# Patient Record
Sex: MALE | Race: WHITE | NOT HISPANIC OR LATINO | URBAN - METROPOLITAN AREA
[De-identification: names, ages, dates, MRNs, and addresses within clinical notes are randomized per-mention and may not be internally consistent; named-entity substitution may affect disease eponyms.]

---

## 2017-07-10 ENCOUNTER — RECORDS - HEALTHEAST (OUTPATIENT)
Dept: LAB | Facility: CLINIC | Age: 52
End: 2017-07-10

## 2017-07-10 LAB
CHOLEST SERPL-MCNC: 228 MG/DL
FASTING STATUS PATIENT QL REPORTED: ABNORMAL
HDLC SERPL-MCNC: 56 MG/DL
LDLC SERPL CALC-MCNC: 156 MG/DL
TRIGL SERPL-MCNC: 78 MG/DL

## 2018-09-11 ENCOUNTER — RECORDS - HEALTHEAST (OUTPATIENT)
Dept: LAB | Facility: CLINIC | Age: 53
End: 2018-09-11

## 2018-09-11 LAB
CHOLEST SERPL-MCNC: 225 MG/DL
FASTING STATUS PATIENT QL REPORTED: ABNORMAL
HDLC SERPL-MCNC: 72 MG/DL
LDLC SERPL CALC-MCNC: 139 MG/DL
TRIGL SERPL-MCNC: 71 MG/DL
TSH SERPL DL<=0.005 MIU/L-ACNC: 2.96 UIU/ML (ref 0.3–5)

## 2018-10-24 ENCOUNTER — OFFICE VISIT - HEALTHEAST (OUTPATIENT)
Dept: UROLOGY | Facility: CLINIC | Age: 53
End: 2018-10-24

## 2018-10-24 DIAGNOSIS — N20.1 CALCULUS OF URETER: ICD-10-CM

## 2018-10-24 DIAGNOSIS — N13.2 HYDRONEPHROSIS WITH URINARY OBSTRUCTION DUE TO URETERAL CALCULUS: ICD-10-CM

## 2018-10-24 DIAGNOSIS — N20.0 CALCULUS OF KIDNEY: ICD-10-CM

## 2018-10-24 LAB
ALBUMIN UR-MCNC: NEGATIVE MG/DL
APPEARANCE UR: CLEAR
BILIRUB UR QL STRIP: NEGATIVE
COLOR UR AUTO: YELLOW
GLUCOSE UR STRIP-MCNC: NEGATIVE MG/DL
HGB UR QL STRIP: ABNORMAL
KETONES UR STRIP-MCNC: NEGATIVE MG/DL
LEUKOCYTE ESTERASE UR QL STRIP: NEGATIVE
NITRATE UR QL: NEGATIVE
PH UR STRIP: 6.5 [PH] (ref 5–8)
SP GR UR STRIP: 1.02 (ref 1–1.03)
UROBILINOGEN UR STRIP-ACNC: ABNORMAL

## 2018-10-24 ASSESSMENT — MIFFLIN-ST. JEOR: SCORE: 1852.31

## 2018-11-07 ENCOUNTER — HOSPITAL ENCOUNTER (OUTPATIENT)
Dept: CT IMAGING | Facility: CLINIC | Age: 53
Discharge: HOME | End: 2018-11-07
Attending: PHYSICIAN ASSISTANT

## 2018-11-07 ENCOUNTER — OFFICE VISIT - HEALTHEAST (OUTPATIENT)
Dept: UROLOGY | Facility: CLINIC | Age: 53
End: 2018-11-07

## 2018-11-07 DIAGNOSIS — N20.0 CALCULUS OF KIDNEY: ICD-10-CM

## 2018-11-07 DIAGNOSIS — N20.1 CALCULUS OF URETER: ICD-10-CM

## 2018-11-07 DIAGNOSIS — N20.9 URINARY TRACT STONES: ICD-10-CM

## 2018-11-07 DIAGNOSIS — N13.2 HYDRONEPHROSIS WITH URINARY OBSTRUCTION DUE TO URETERAL CALCULUS: ICD-10-CM

## 2018-11-07 LAB
ALBUMIN UR-MCNC: NEGATIVE MG/DL
APPEARANCE UR: CLEAR
BILIRUB UR QL STRIP: NEGATIVE
COLOR UR AUTO: YELLOW
GLUCOSE UR STRIP-MCNC: NEGATIVE MG/DL
HGB UR QL STRIP: ABNORMAL
KETONES UR STRIP-MCNC: NEGATIVE MG/DL
LEUKOCYTE ESTERASE UR QL STRIP: NEGATIVE
NITRATE UR QL: NEGATIVE
PH UR STRIP: 7 [PH] (ref 5–8)
SP GR UR STRIP: 1.02 (ref 1–1.03)
UROBILINOGEN UR STRIP-ACNC: ABNORMAL

## 2018-11-13 ENCOUNTER — AMBULATORY - HEALTHEAST (OUTPATIENT)
Dept: LAB | Facility: CLINIC | Age: 53
End: 2018-11-13

## 2018-11-13 ENCOUNTER — COMMUNICATION - HEALTHEAST (OUTPATIENT)
Dept: UROLOGY | Facility: CLINIC | Age: 53
End: 2018-11-13

## 2018-11-13 DIAGNOSIS — N20.0 CALCULUS OF KIDNEY: ICD-10-CM

## 2018-11-14 LAB
1ST CONSTITUENT:: NORMAL
2ND CONSTITUENT:: NORMAL
SOURCE: NORMAL

## 2018-12-05 ENCOUNTER — COMMUNICATION - HEALTHEAST (OUTPATIENT)
Dept: UROLOGY | Facility: CLINIC | Age: 53
End: 2018-12-05

## 2018-12-12 ENCOUNTER — OFFICE VISIT - HEALTHEAST (OUTPATIENT)
Dept: UROLOGY | Facility: CLINIC | Age: 53
End: 2018-12-12

## 2018-12-12 DIAGNOSIS — N20.0 CALCULUS OF KIDNEY: ICD-10-CM

## 2018-12-12 DIAGNOSIS — N20.1 CALCULUS OF URETER: ICD-10-CM

## 2021-06-02 VITALS — BODY MASS INDEX: 29.39 KG/M2 | WEIGHT: 217 LBS | HEIGHT: 72 IN

## 2021-06-16 PROBLEM — N20.0 CALCULUS OF KIDNEY: Status: ACTIVE | Noted: 2018-10-24

## 2021-06-21 NOTE — PROGRESS NOTES
Assessment/Plan:        Diagnoses and all orders for this visit:    Calculus of ureter  -     Urinalysis Macroscopic  -     tamsulosin (FLOMAX) 0.4 mg cap; Take 1 capsule (0.4 mg total) by mouth Daily after breakfast for 14 days.  Dispense: 14 capsule; Refill: 1  -     Symptom Control While Passing a Stone Education  -     CT Abdomen Pelvis Without Oral Without IV Contrast; Future; Expected date: 11/7/18  -     Patient Stated Goal: Pass my stone    Calculus of kidney    Hydronephrosis with urinary obstruction due to ureteral calculus    Other orders  -     Cancel: Urinalysis Macroscopic  -     multivitamin (ONE A DAY) per tablet; Take by mouth.  -     Discontinue: PROAIR HFA 90 mcg/actuation inhaler; INHALE 2 PUFFS BY MOUTH 4 TIMES DAILY AS NEEDED FOR 30 DAYS; Refill: 0  -     HYDROcodone-acetaminophen 5-325 mg per tablet;   -     Discontinue: tamsulosin (FLOMAX) 0.4 mg cap;       Stone Management Plan  [facility] Stone Management 10/24/2018   Urinary Tract Infection No suspicion of infection   Renal Colic Asymptomatic at this time   Renal Failure No suspicion of renal failure   Current CT date 10/16/2018   Right sided stones? Yes   R Number of ureteral stones No ureteral stones   R Number of kidney stones  1   R GSD of kidney stones < 2   R Hydronephrosis None   R Stone Event No current event   R Current Plan Observe   Observe rationale Limited stone burden with good prognosis for spontaneous passage   Left sided stones? Yes   L Number of ureteral stones 1   L GSD of ureteral stones 5   L Location of ureteral stone Mid   L Number of kidney stones  2   L GSD of kidney stones 2 - 4   L Hydronephrosis Mild   L Stone Event New event   Diagnosis date 10/16/2018   Initial location of primary symptomatic stone Mid   Initial GSD of primary symptomatic stone 5   L MET Status Initiation   L Current Plan MET   MET 2 week F/U           Subjective:      HPI  Mr. Jadiel Deshpande is a 52 y.o.  male presenting to the [facility]  [facility] [place] referred for nephrolithiasis.    He is a first time unidentified composition stone former who has not required stone clearance procedures. He has not previously participated in stone risk evaluation. He has no identified modifiable stone risk factors. He has no identified non-modifiable stone risk factors.    He was evaluated through his PCP clinic 10/16/18 for complaint of acute onset left flank pain x 1 day. The pain was sharp and intermittent, radiating to the left abdomen.  Significant associated symptoms at presentation included:  Chest pain, numbness in fingers and toes. Urinalysis was notable for hematuria with mild bacteriuria, hemogram was unremarkable. EKG was normal. CT scan reported a 4 mm left mid ureteral stone with mild obstruction. He was sent home with dramamine, flomax, and norco. He did not get a urine strainer.    He has not had any further flank pain over past 5 days. He notes intermittent \"pinching\" sensation with suprapubic area. He is asymptomatic at present. He denies symptoms of fever, chills, flank pain, nausea, vomiting, urinary frequency and dysuria.     Outside CT scan from 10/16/18 is personally reviewed and demonstrates a mildly obstructing 5 mm left mid ureteral stone. Additional left renal stones x 2 (3 mm mid pole and 1 mm lower pole). There is a faint right lower pole papillary tip calcification. Left renal cortical cysts.    Significant labs from presentation include mild hematuria, no pyuria, negative nitrite, no bacteria and normal WBC.    PLAN    53 yo M first time stone former with mildly obstructing left mid ureteral stone, symptoms well controlled. Small renal stones.    Will proceed with medical expulsive therapy. Risks and benefits were detailed of medical expulsive therapy including probability of stone passage, recurrent renal colic, and requirement of emergency medical and/or surgical care and further imaging. Stone prevention measures reviewed " with patient. Patient verbalized understanding. Patient agrees with plan as discussed.  He will return in 2 weeks with low dose CT scan.    For symptom control, he has prescribed Percocet and Flomax. Over the counter symptom control medications of ibuprofen, Dramamine and Tylenol were recommended.    Patient also seen and examined by MEG Capone   Review of Systems  A 12 point comprehensive review of systems is negative except for HPI    Past Medical History:   Diagnosis Date     Kidney stone 10/16/2018     Past Surgical History:   Procedure Laterality Date     COLONOSCOPY W/ POLYPECTOMY      2 polyps removed     lower lumbar bulging disk surgery   2002     Current Outpatient Prescriptions   Medication Sig Dispense Refill     HYDROcodone-acetaminophen 5-325 mg per tablet        multivitamin (ONE A DAY) per tablet Take by mouth.       tamsulosin (FLOMAX) 0.4 mg cap Take 1 capsule (0.4 mg total) by mouth Daily after breakfast for 14 days. 14 capsule 1     No current facility-administered medications for this visit.      No Known Allergies    Social History     Social History     Marital status: Single     Spouse name: N/A     Number of children: N/A     Years of education: N/A     Occupational History           Social History Main Topics     Smoking status: Never Smoker     Smokeless tobacco: Never Used     Alcohol use Yes      Comment: rare     Drug use: No     Sexual activity: Not on file     Other Topics Concern     Not on file     Social History Narrative     No narrative on file     Family History   Problem Relation Age of Onset     Cancer Mother      lung     Polycythemia Mother      Hypothyroidism Mother      Hypothyroidism Father      Heart failure Maternal Grandfather      Other Paternal Grandfather      Black Lung Disease     Heart attack Paternal Grandfather      Hypothyroidism Maternal Uncle      Objective:      Physical Exam  Vitals:    10/24/18 1405   BP: 130/69   Pulse: 92    Temp: 98.5  F (36.9  C)     General - well developed, well nourished, appropriate for age. Appears no distress at this time   Heart - regular rate and rhythm, no murmur  Respiratory - normal effort, clear to auscultation, good air entry without adventitious noises  Abdomen - mildly obese soft, non-tender, no hepatosplenomegaly, no masses.   - no flank tenderness, no suprapubic tenderness, kidney and bladder non-palpable  MSK - normal spinal curvature. no spinal tenderness. normal gait. muscular strength intact.  Neurology - cranial nerves II-XII grossly intact, normal sensation, no unsteadiness  Skin - intact, no bruising, no gouty tophi  Psych - oriented to time, place, and person, normal mood and affect.    Labs  Urinalysis POC (Office):  Nitrite, UA   Date Value Ref Range Status   10/24/2018 Negative Negative Final       Lab Urinalysis:  Blood, UA   Date Value Ref Range Status   10/24/2018 Small (!) Negative Final     Nitrite, UA   Date Value Ref Range Status   10/24/2018 Negative Negative Final     Leukocytes, UA   Date Value Ref Range Status   10/24/2018 Negative Negative Final     pH, UA   Date Value Ref Range Status   10/24/2018 6.5 5.0 - 8.0 Final            I, Camden Vines, personally saw and examined the patient, reviewed most recent labs and imaging and agree with the above assessment

## 2021-06-21 NOTE — PROGRESS NOTES
Patient presents to the office today for kidney stone consultation from a Primary Care Referral.

## 2021-06-21 NOTE — PROGRESS NOTES
Assessment/Plan:        Diagnoses and all orders for this visit:    Calculus of ureter  -     Symptom Control While Passing a Stone Education  -     CT Abdomen Pelvis Without Oral Without IV Contrast; Future; Expected date: 11/28/18  -     Patient Stated Goal: Pass my stone    Urinary tract stones  -     Urinalysis Macroscopic    Calculus of kidney    Hydronephrosis with urinary obstruction due to ureteral calculus      Stone Management Plan  [facility] Stone [facility] 10/24/2018   Urinary Tract Infection No suspicion of infection   Renal Colic Asymptomatic at this time   Renal Failure No suspicion of renal failure   Current CT date 10/16/2018   Right sided stones? Yes   R Number of ureteral stones No ureteral stones   R Number of kidney stones  1   R GSD of kidney stones < 2   R Hydronephrosis None   R Stone Event No current event   R Current Plan Observe   Observe rationale Limited stone burden with good prognosis for spontaneous passage   Left sided stones? Yes   L Number of ureteral stones 1   L GSD of ureteral stones 5   L Location of ureteral stone Mid   L Number of kidney stones  2   L GSD of kidney stones 2 - 4   L Hydronephrosis Mild   L Stone Event New event   Diagnosis date 10/16/2018   Initial location of primary symptomatic stone Mid   Initial GSD of primary symptomatic stone 5   L MET Status Initiation   L Current Plan MET   MET 2 week F/U           Subjective:      HPI  Mr. Jadiel Deshpande is a 52 y.o.  male returning to the [facility] [place] for medical expulsive therapy follow up.     On last encounter, his 5 mm stone was in left mid ureter with mild hydronephrosis. He has had no unanticipated events.    Symptoms have been minimal. Within the past week, he has had onset of urinary urgency. He has not seen a stone pass. He is asymptomatic at present. He denies symptoms of fever, chills, flank pain, nausea, vomiting, urinary frequency and dysuria.     New CT scan was personally reviewed  and demonstrates progression of stone to left UVJ with mild hydronephrosis.   Small, bilateral renal stones.     PLAN    51 yo M first time stone former with progression of left ureteral stone, now within UVJ, stable mild hydronephrosis. Small renal stones.     He will continue to attempt to pass stone and will return in 3 weeks with further imaging.        ROS   Review of systems is negative except for HPI.    Past Medical History:   Diagnosis Date     Kidney stone 10/16/2018     Past Surgical History:   Procedure Laterality Date     COLONOSCOPY W/ POLYPECTOMY      2 polyps removed     lower lumbar bulging disk surgery   2002     Current Outpatient Prescriptions   Medication Sig Dispense Refill     multivitamin (ONE A DAY) per tablet Take by mouth.       HYDROcodone-acetaminophen 5-325 mg per tablet        tamsulosin (FLOMAX) 0.4 mg cap Take 1 capsule (0.4 mg total) by mouth Daily after breakfast for 14 days. 14 capsule 1     No current facility-administered medications for this visit.      No Known Allergies    Social History     Social History     Marital status: Single     Spouse name: N/A     Number of children: N/A     Years of education: N/A     Occupational History           Social History Main Topics     Smoking status: Never Smoker     Smokeless tobacco: Never Used     Alcohol use Yes      Comment: rare     Drug use: No     Sexual activity: Not on file     Other Topics Concern     Not on file     Social History Narrative     No narrative on file     Family History   Problem Relation Age of Onset     Cancer Mother      lung     Polycythemia Mother      Hypothyroidism Mother      Hypothyroidism Father      Heart failure Maternal Grandfather      Other Paternal Grandfather      Black Lung Disease     Heart attack Paternal Grandfather      Hypothyroidism Maternal Uncle      Objective:      Physical Exam  Vitals:    11/07/18 1410   BP: 118/72   Pulse: 80   Temp: 98.9  F (37.2  C)     General -  well developed, well nourished, appropriate for age. Appears no distress at this time  Abdomen - slender soft, non-tender, no hepatosplenomegaly, no masses.   - no flank tenderness, no suprapubic tenderness, kidney and bladder non-palpable  MSK - normal spinal curvature. no spinal tenderness. normal gait. muscular strength intact.  Psych - oriented to time, place, and person, normal mood and affect.    Labs   Urinalysis POC (Office):  Nitrite, UA   Date Value Ref Range Status   11/07/2018 Negative Negative Final   10/24/2018 Negative Negative Final       Lab Urinalysis:  Blood, UA   Date Value Ref Range Status   11/07/2018 Trace (!) Negative Final   10/24/2018 Small (!) Negative Final     Nitrite, UA   Date Value Ref Range Status   11/07/2018 Negative Negative Final   10/24/2018 Negative Negative Final     Leukocytes, UA   Date Value Ref Range Status   11/07/2018 Negative Negative Final   10/24/2018 Negative Negative Final     pH, UA   Date Value Ref Range Status   11/07/2018 7.0 5.0 - 8.0 Final   10/24/2018 6.5 5.0 - 8.0 Final

## 2021-06-22 NOTE — PROGRESS NOTES
Assessment/Plan:        Diagnoses and all orders for this visit:    Calculus of ureter    Calculus of kidney      Stone Management Plan  [facility] 10/24/2018 11/7/2018 12/12/2018   Urinary Tract Infection No suspicion of infection No suspicion of infection No suspicion of infection   Renal Colic Asymptomatic at this time Asymptomatic at this time Asymptomatic at this time   Renal Failure No suspicion of renal failure No suspicion of renal failure No suspicion of renal failure   Current CT date 10/16/2018 11/7/2018 -   Right sided stones? Yes Yes -   R Number of ureteral stones No ureteral stones No ureteral stones -   R Number of kidney stones  1 Renal stones unchanged from last exam -   R GSD of kidney stones < 2 < 2 -   R Hydronephrosis None None -   R Stone Event No current event No current event No current event   R Current Plan Observe Observe Observe   Observe rationale Limited stone burden with good prognosis for spontaneous passage Limited stone burden with good prognosis for spontaneous passage Limited stone burden with good prognosis for spontaneous passage   Left sided stones? Yes Yes -   L Number of ureteral stones 1 1 -   L GSD of ureteral stones 5 4 -   L Location of ureteral stone Mid Distal -   L Number of kidney stones  2 1 -   L GSD of kidney stones 2 - 4 2 - 4 -   L Hydronephrosis Mild Mild -   L Stone Event New event Established event Resolved event   Diagnosis date 10/16/2018 - -   Initial location of primary symptomatic stone Mid Distal -   Initial GSD of primary symptomatic stone 5 4 -   Resolved date - - 11/13/2018   L MET Status Initiation Progression Passed   L Current Plan MET MET Observe   MET 2 week F/U (No Data) -   Observe rationale - - Limited stone burden with good prognosis for spontaneous passage             Subjective:      HPI  Mr. Jadiel Deshpande is a 53 y.o.  male returning to the [facility] [place] for medical expulsive therapy follow up.     On  last encounter, his 5 mm stone was in left mid ureter with mild hydronephrosis. He has had no unanticipated events.    He passed the stone in the interval and is here to review results of stone analysis. He is asymptomatic at present. He denies symptoms of fever, chills, flank pain, nausea, vomiting, urinary frequency and dysuria.     Imaging was not performed today because he has passed stone and captured for analysis.     Stone analysis was 70 % calcium oxalate and 30 % calcium phosphate (apatite).    PLAN    52 yo M first time stone former with passage of left ureteral stone. Small, bilateral renal stones, asymptomatic.    He is congratulated on passing his stone and politely declines proceeding with stone risk evaluation. We reviewed conservative measures for prevention.     He would like to follow up on a prn basis.     Patient also seen and examined by Zoë Romero PA-C       ROS   Review of systems is negative except for HPI.    Past Medical History:   Diagnosis Date     Kidney stone 10/16/2018       Past Surgical History:   Procedure Laterality Date     COLONOSCOPY W/ POLYPECTOMY      2 polyps removed     lower lumbar bulging disk surgery   2002       Current Outpatient Medications   Medication Sig Dispense Refill     multivitamin (ONE A DAY) per tablet Take by mouth.       No current facility-administered medications for this visit.        No Known Allergies    Social History     Socioeconomic History     Marital status: Single     Spouse name: Not on file     Number of children: Not on file     Years of education: Not on file     Highest education level: Not on file   Social Needs     Financial resource strain: Not on file     Food insecurity - worry: Not on file     Food insecurity - inability: Not on file     Transportation needs - medical: Not on file     Transportation needs - non-medical: Not on file   Occupational History     Occupation:    Tobacco Use     Smoking status: Never Smoker      Smokeless tobacco: Never Used   Substance and Sexual Activity     Alcohol use: Yes     Comment: rare     Drug use: No     Sexual activity: Not on file   Other Topics Concern     Not on file   Social History Narrative     Not on file       Family History   Problem Relation Age of Onset     Cancer Mother         lung     Polycythemia Mother      Hypothyroidism Mother      Hypothyroidism Father      Heart failure Maternal Grandfather      Other Paternal Grandfather         Black Lung Disease     Heart attack Paternal Grandfather      Hypothyroidism Maternal Uncle      Objective:      Physical Exam  Vitals:    12/12/18 1358   BP: 127/71   Pulse: 97   Temp: 97.7  F (36.5  C)     General - well developed, well nourished, appropriate for age. Appears no distress at this time  Abdomen - slender soft, non-tender, no hepatosplenomegaly, no masses.   - no flank tenderness, no suprapubic tenderness, kidney and bladder non-palpable  MSK - normal spinal curvature. no spinal tenderness. normal gait. muscular strength intact.  Psych - oriented to time, place, and person, normal mood and affect.      Labs   Urinalysis POC (Office):  Nitrite, UA   Date Value Ref Range Status   11/07/2018 Negative Negative Final   10/24/2018 Negative Negative Final       Lab Urinalysis:  Blood, UA   Date Value Ref Range Status   11/07/2018 Trace (!) Negative Final   10/24/2018 Small (!) Negative Final     Nitrite, UA   Date Value Ref Range Status   11/07/2018 Negative Negative Final   10/24/2018 Negative Negative Final     Leukocytes, UA   Date Value Ref Range Status   11/07/2018 Negative Negative Final   10/24/2018 Negative Negative Final     pH, UA   Date Value Ref Range Status   11/07/2018 7.0 5.0 - 8.0 Final   10/24/2018 6.5 5.0 - 8.0 Final     Camden DIEHL, personally saw and examined the patient, reviewed most recent labs and imaging and agree with the above assessment